# Patient Record
Sex: MALE | Race: WHITE
[De-identification: names, ages, dates, MRNs, and addresses within clinical notes are randomized per-mention and may not be internally consistent; named-entity substitution may affect disease eponyms.]

---

## 2019-08-20 ENCOUNTER — HOSPITAL ENCOUNTER (OUTPATIENT)
Dept: HOSPITAL 82 - ED | Age: 69
Setting detail: OBSERVATION
LOS: 2 days | Discharge: HOME | End: 2019-08-22
Attending: INTERNAL MEDICINE | Admitting: INTERNAL MEDICINE
Payer: MEDICARE

## 2019-08-20 VITALS — HEIGHT: 72 IN | WEIGHT: 315 LBS | BODY MASS INDEX: 42.66 KG/M2

## 2019-08-20 DIAGNOSIS — I51.7: ICD-10-CM

## 2019-08-20 DIAGNOSIS — Z79.84: ICD-10-CM

## 2019-08-20 DIAGNOSIS — I10: ICD-10-CM

## 2019-08-20 DIAGNOSIS — I25.10: ICD-10-CM

## 2019-08-20 DIAGNOSIS — Z79.82: ICD-10-CM

## 2019-08-20 DIAGNOSIS — J40: ICD-10-CM

## 2019-08-20 DIAGNOSIS — E11.65: ICD-10-CM

## 2019-08-20 DIAGNOSIS — D50.9: Primary | ICD-10-CM

## 2019-08-20 DIAGNOSIS — Z87.01: ICD-10-CM

## 2019-08-20 DIAGNOSIS — E11.40: ICD-10-CM

## 2019-08-20 DIAGNOSIS — E78.5: ICD-10-CM

## 2019-08-20 DIAGNOSIS — Z95.5: ICD-10-CM

## 2019-08-20 DIAGNOSIS — Z79.02: ICD-10-CM

## 2019-08-20 LAB
ALBUMIN SERPL-MCNC: 3.7 G/DL (ref 3.2–5)
ALP SERPL-CCNC: 144 U/L (ref 38–126)
ANION GAP SERPL CALCULATED.3IONS-SCNC: 14 MMOL/L
AST SERPL-CCNC: 18 U/L (ref 19–48)
BASOPHILS NFR BLD AUTO: 0 % (ref 0–3)
BILIRUB UR QL STRIP.AUTO: NEGATIVE
BUN SERPL-MCNC: 14 MG/DL (ref 8–23)
BUN/CREAT SERPL: 16
CHLORIDE SERPL-SCNC: 97 MMOL/L (ref 95–108)
CO2 SERPL-SCNC: 26 MMOL/L (ref 22–30)
COLOR UR AUTO: YELLOW
CREAT SERPL-MCNC: 0.9 MG/DL (ref 0.7–1.3)
EOSINOPHIL NFR BLD AUTO: 2 % (ref 0–8)
ERYTHROCYTE [DISTWIDTH] IN BLOOD BY AUTOMATED COUNT: 18.6 % (ref 11.5–15.5)
GLUCOSE UR STRIP.AUTO-MCNC: >=1000 MG/DL
HCT VFR BLD AUTO: 27.9 % (ref 39–50)
HGB BLD-MCNC: 8 G/DL (ref 14–18)
HGB UR QL STRIP.AUTO: (no result)
IMM GRANULOCYTES NFR BLD: 0.5 % (ref 0–5)
KETONES UR STRIP.AUTO-MCNC: NEGATIVE MG/DL
LEUKOCYTE ESTERASE UR QL STRIP.AUTO: NEGATIVE
LYMPHOCYTES NFR BLD: 6 % (ref 15–41)
MCH RBC QN AUTO: 20.3 PG  CALC (ref 26–32)
MCHC RBC AUTO-ENTMCNC: 28.7 G/L CALC (ref 32–36)
MCV RBC AUTO: 70.6 FL  CALC (ref 80–100)
MONOCYTES NFR BLD AUTO: 9 % (ref 2–13)
NEUTROPHILS # BLD AUTO: 11.74 THOU/UL (ref 1.82–7.42)
NEUTROPHILS NFR BLD AUTO: 82 % (ref 42–76)
NITRITE UR QL STRIP.AUTO: NEGATIVE
PH UR STRIP.AUTO: 6.5 [PH] (ref 4.5–8)
PLATELET # BLD AUTO: 264 THOU/UL (ref 130–400)
POTASSIUM SERPL-SCNC: 4.5 MMOL/L (ref 3.5–5.1)
PROT SERPL-MCNC: 7 G/DL (ref 6.3–8.2)
PROT UR QL STRIP.AUTO: NEGATIVE MG/DL
RBC # BLD AUTO: 3.95 MILL/UL (ref 4.7–6.1)
SODIUM SERPL-SCNC: 132 MMOL/L (ref 137–146)
SP GR UR STRIP.AUTO: 1.01
UROBILINOGEN UR QL STRIP.AUTO: 0.2 E.U./DL

## 2019-08-20 PROCEDURE — P9016 RBC LEUKOCYTES REDUCED: HCPCS

## 2019-08-21 VITALS — SYSTOLIC BLOOD PRESSURE: 138 MMHG | DIASTOLIC BLOOD PRESSURE: 83 MMHG

## 2019-08-21 VITALS — DIASTOLIC BLOOD PRESSURE: 71 MMHG | SYSTOLIC BLOOD PRESSURE: 167 MMHG

## 2019-08-21 VITALS — SYSTOLIC BLOOD PRESSURE: 131 MMHG | DIASTOLIC BLOOD PRESSURE: 69 MMHG

## 2019-08-21 VITALS — SYSTOLIC BLOOD PRESSURE: 134 MMHG | DIASTOLIC BLOOD PRESSURE: 72 MMHG

## 2019-08-21 VITALS — SYSTOLIC BLOOD PRESSURE: 115 MMHG | DIASTOLIC BLOOD PRESSURE: 67 MMHG

## 2019-08-21 VITALS — DIASTOLIC BLOOD PRESSURE: 78 MMHG | SYSTOLIC BLOOD PRESSURE: 151 MMHG

## 2019-08-21 VITALS — DIASTOLIC BLOOD PRESSURE: 74 MMHG | SYSTOLIC BLOOD PRESSURE: 134 MMHG

## 2019-08-21 VITALS — SYSTOLIC BLOOD PRESSURE: 124 MMHG | DIASTOLIC BLOOD PRESSURE: 47 MMHG

## 2019-08-21 VITALS — DIASTOLIC BLOOD PRESSURE: 47 MMHG | SYSTOLIC BLOOD PRESSURE: 124 MMHG

## 2019-08-21 VITALS — SYSTOLIC BLOOD PRESSURE: 132 MMHG | DIASTOLIC BLOOD PRESSURE: 58 MMHG

## 2019-08-21 VITALS — SYSTOLIC BLOOD PRESSURE: 148 MMHG | DIASTOLIC BLOOD PRESSURE: 78 MMHG

## 2019-08-21 LAB
ERYTHROCYTE [DISTWIDTH] IN BLOOD BY AUTOMATED COUNT: 19 % (ref 11.5–15.5)
HCT VFR BLD AUTO: 26.3 % (ref 39–50)
HGB BLD-MCNC: 7.4 G/DL (ref 14–18)
INR PPP: 1 RATIO (ref 0.7–1.3)
MCH RBC QN AUTO: 20.3 PG  CALC (ref 26–32)
MCHC RBC AUTO-ENTMCNC: 28.1 G/L CALC (ref 32–36)
MCV RBC AUTO: 72.1 FL  CALC (ref 80–100)
PLATELET # BLD AUTO: 249 THOU/UL (ref 130–400)
PROTHROMBIN TIME: 10.5 SECONDS (ref 9–12.5)
RBC # BLD AUTO: 3.65 MILL/UL (ref 4.7–6.1)

## 2019-08-21 PROCEDURE — 30233N1 TRANSFUSION OF NONAUTOLOGOUS RED BLOOD CELLS INTO PERIPHERAL VEIN, PERCUTANEOUS APPROACH: ICD-10-PCS | Performed by: INTERNAL MEDICINE

## 2019-08-21 NOTE — NUR
PT IS RESTING IN BED WITH NO S/S OF DISTRESS NOTED. PT DENIES NEEDS AT THIS
TIME. CALL LIGHT IN REACH.

## 2019-08-21 NOTE — NUR
REPORT FROM KEVIN SHIRLEY. PT SITTING UP IN BED. ALERT AND ORIENTED. NO DISTRESS
NOTED. PT DENIES ANY PAIN OR DISCOMFORT. PT C/O COUGH PRODUCING CLEAR THIN
MUCUS. IV SITE APPEARS HEALTHY. IV FLUIDS INFUSING WITHOUT DIFFICULTY.
DISCUSSED POC. PT VERBALIZED UNDERSTANDING. CALL LIGHT WITHIN REACH. WILL
CONTINUE TO MONITOR.

## 2019-08-21 NOTE — NUR
Admission Note
 
 
Report Given to:         ANGELA SHIRLEY
Transported by:           Wheelchair          X Stretcher
 
Transported with:        X Nurse     Transporter   X Patent IV    O2
                         X Cardiac Monitor

## 2019-08-21 NOTE — NUR
PT. ARRIVED TO THE FLOOR WITH ER NURSE, CARTER BENNETT IN OBTAINING VS. RT
NOTIFIED OF NEED FOR EKG.

## 2019-08-21 NOTE — NUR
ASSESSMENT DONE . TELE IN PLACE. PT IS A&O X3. PT DENIES PAIN AT THIS TIME.
IVF INFUSNING WELL. WIFE IN ROOM. PT DENIES NEEDS AT THIS TIME. CALL LIGHT IN
REACH.

## 2019-08-21 NOTE — NUR
ADMISSION ASSESSMENT COMPLETED. IV SITE PATENT AND BOLUS STARTED AS PER ORDER.
PT. IS ORIENTED TO CALL LIGHT, ROOM, AND POC; VERBALIZES UNDERSTANDING. WIFE
REMAINS AT BEDSIDE TO STAY THE NIGHT. PT. DENIES NEEDS/PAIN. TELEMETRY IN
PLACE. , PER PT. HE CAN NOT TAKE ANY INSULIN AS HE IS VERY SENSITIVE TO
IT. DECLINES TEDS AND SOCKS TO BE APPLIED. PT. INFORMED OF NEED FOR BM
SPECIMEN AND TO VOID AT ALL TIMES IN URINAL; VERBALIZES UNDERSTANDING. CALL
LIGHT IS IN REACH.

## 2019-08-21 NOTE — NUR
EXPLAIN TO PT S/S OF BLOOD TRANSFUSION. PT VERBALIZED UNDERSTANDING. BLOOD
TRANSFUSION STARTED. PT IS RESTING IN BED. WIFE IN ROOM. PT DENIES NEEDS AT
THIS TIME. CALL LIGHT IN REACH.

## 2019-08-21 NOTE — NUR
SPOKE WITH PEEWEE BARROW AND NOTIFIED HIM OF PT'S BS BEING 313 AND THAT PT. REPORTS HE
IS VERY SENSITIVE TO INSULIN AND WILL BOTTOM OUT. PER MD WE WILL LEAVE BS AS
IS FOR NOW. UPDATED PT.

## 2019-08-22 VITALS — DIASTOLIC BLOOD PRESSURE: 82 MMHG | SYSTOLIC BLOOD PRESSURE: 152 MMHG

## 2019-08-22 VITALS — DIASTOLIC BLOOD PRESSURE: 73 MMHG | SYSTOLIC BLOOD PRESSURE: 173 MMHG

## 2019-08-22 VITALS — SYSTOLIC BLOOD PRESSURE: 156 MMHG | DIASTOLIC BLOOD PRESSURE: 59 MMHG

## 2019-08-22 VITALS — DIASTOLIC BLOOD PRESSURE: 76 MMHG | SYSTOLIC BLOOD PRESSURE: 151 MMHG

## 2019-08-22 LAB
ANION GAP SERPL CALCULATED.3IONS-SCNC: 13 MMOL/L
BUN SERPL-MCNC: 13 MG/DL (ref 8–23)
BUN/CREAT SERPL: 15
CHLORIDE SERPL-SCNC: 104 MMOL/L (ref 95–108)
CO2 SERPL-SCNC: 26 MMOL/L (ref 22–30)
CREAT SERPL-MCNC: 0.8 MG/DL (ref 0.7–1.3)
ERYTHROCYTE [DISTWIDTH] IN BLOOD BY AUTOMATED COUNT: 19.3 % (ref 11.5–15.5)
HCT VFR BLD AUTO: 29.8 % (ref 39–50)
HGB BLD-MCNC: 8.5 G/DL (ref 14–18)
MCH RBC QN AUTO: 20.8 PG  CALC (ref 26–32)
MCHC RBC AUTO-ENTMCNC: 28.5 G/L CALC (ref 32–36)
MCV RBC AUTO: 73 FL  CALC (ref 80–100)
PLATELET # BLD AUTO: 254 THOU/UL (ref 130–400)
POTASSIUM SERPL-SCNC: 4.9 MMOL/L (ref 3.5–5.1)
RBC # BLD AUTO: 4.08 MILL/UL (ref 4.7–6.1)
SODIUM SERPL-SCNC: 138 MMOL/L (ref 137–146)

## 2019-08-22 NOTE — NUR
PT HAD INCONTINENT BOWEL EPISODE. COMPLETE LINEN CHANGE. PT ASSISTED WITH
PARTIAL BED BATH. PT DENIES ANY CURRENT WANTS OR NEEDS. WIFE REMAINS IN ROOM.
CALL LIGHT WITHIN REACH. WILL CONTINUE TO MONITOR.

## 2019-08-22 NOTE — NUR
REPORT WAS RECEIVED FROM TIEN. ASSESSMENT DONE. TELE IN PLACE. PT IS A&O X3.
PT DENIES PAIN. PT DENIES NEEDS AT THIS TIME. IVF INFUSING WELL. WIFE IN ROOM.
CALL LIGHT IN REACH.

## 2019-11-30 ENCOUNTER — HOSPITAL ENCOUNTER (OUTPATIENT)
Dept: HOSPITAL 82 - ED | Age: 69
Setting detail: OBSERVATION
LOS: 1 days | Discharge: HOME | End: 2019-12-01
Attending: INTERNAL MEDICINE | Admitting: INTERNAL MEDICINE
Payer: MEDICARE

## 2019-11-30 VITALS — BODY MASS INDEX: 42.66 KG/M2 | WEIGHT: 315 LBS | HEIGHT: 72 IN

## 2019-11-30 DIAGNOSIS — I10: ICD-10-CM

## 2019-11-30 DIAGNOSIS — E11.40: ICD-10-CM

## 2019-11-30 DIAGNOSIS — Z79.84: ICD-10-CM

## 2019-11-30 DIAGNOSIS — J10.1: Primary | ICD-10-CM

## 2019-11-30 DIAGNOSIS — I25.10: ICD-10-CM

## 2019-11-30 DIAGNOSIS — Z95.5: ICD-10-CM

## 2019-11-30 DIAGNOSIS — E11.65: ICD-10-CM

## 2019-11-30 DIAGNOSIS — R09.02: ICD-10-CM

## 2019-11-30 DIAGNOSIS — E78.5: ICD-10-CM

## 2019-11-30 DIAGNOSIS — I25.2: ICD-10-CM

## 2019-11-30 LAB
ALBUMIN SERPL-MCNC: 4.1 G/DL (ref 3.2–5)
ALP SERPL-CCNC: 166 U/L (ref 38–126)
AMYLASE SERPL-CCNC: 41 U/L (ref 30–110)
ANION GAP SERPL CALCULATED.3IONS-SCNC: 17 MMOL/L
AST SERPL-CCNC: 25 U/L (ref 19–48)
BACTERIA #/AREA URNS HPF: (no result) HPF
BASOPHILS NFR BLD AUTO: 0 % (ref 0–3)
BILIRUB UR QL STRIP.AUTO: NEGATIVE
BUN SERPL-MCNC: 14 MG/DL (ref 8–23)
BUN/CREAT SERPL: 18
CHLORIDE SERPL-SCNC: 97 MMOL/L (ref 95–108)
CO2 SERPL-SCNC: 26 MMOL/L (ref 22–30)
COLOR UR AUTO: YELLOW
CREAT SERPL-MCNC: 0.8 MG/DL (ref 0.7–1.3)
EOSINOPHIL NFR BLD AUTO: 3 % (ref 0–8)
EPI CELLS URNS QL MICRO: (no result) EPI/HPF
ERYTHROCYTE [DISTWIDTH] IN BLOOD BY AUTOMATED COUNT: 18.2 % (ref 11.5–15.5)
GLUCOSE UR STRIP.AUTO-MCNC: >=1000 MG/DL
HCT VFR BLD AUTO: 39.7 % (ref 39–50)
HGB BLD-MCNC: 12.3 G/DL (ref 14–18)
HGB UR QL STRIP.AUTO: (no result)
IMM GRANULOCYTES NFR BLD: 0.3 % (ref 0–5)
KETONES UR STRIP.AUTO-MCNC: NEGATIVE MG/DL
LEUKOCYTE ESTERASE UR QL STRIP.AUTO: NEGATIVE
LIPASE SERPL-CCNC: 201 U/L (ref 23–300)
LYMPHOCYTES NFR BLD: 6 % (ref 15–41)
MCH RBC QN AUTO: 25 PG  CALC (ref 26–32)
MCHC RBC AUTO-ENTMCNC: 31 G/L CALC (ref 32–36)
MCV RBC AUTO: 80.7 FL  CALC (ref 80–100)
MONOCYTES NFR BLD AUTO: 9 % (ref 2–13)
NEUTROPHILS # BLD AUTO: 8.5 THOU/UL (ref 1.82–7.42)
NEUTROPHILS NFR BLD AUTO: 81 % (ref 42–76)
NITRITE UR QL STRIP.AUTO: NEGATIVE
PH UR STRIP.AUTO: 6 [PH] (ref 4.5–8)
PLATELET # BLD AUTO: 198 THOU/UL (ref 130–400)
POTASSIUM SERPL-SCNC: 4.8 MMOL/L (ref 3.5–5.1)
PROT SERPL-MCNC: 7.7 G/DL (ref 6.3–8.2)
PROT UR QL STRIP.AUTO: (no result) MG/DL
RBC # BLD AUTO: 4.92 MILL/UL (ref 4.7–6.1)
SODIUM SERPL-SCNC: 134 MMOL/L (ref 137–146)
SP GR UR STRIP.AUTO: 1.01
UROBILINOGEN UR QL STRIP.AUTO: 0.2 E.U./DL
WBC #/AREA URNS HPF: (no result) WBC/HPF (ref 0–5)

## 2019-11-30 PROCEDURE — G9019 OSELTAMIVIR PHOSPHATE 75MG: HCPCS

## 2019-11-30 NOTE — NUR
PT. TO ROOM 10 VIA EMS WITH C/O FEVER AND WEAKNESS X 2 DAYS. PT. TEMP NOW
101.5. O2 SAT ON RA 89%. MD AWARE.

## 2019-12-01 VITALS — SYSTOLIC BLOOD PRESSURE: 153 MMHG | DIASTOLIC BLOOD PRESSURE: 56 MMHG

## 2019-12-01 VITALS — SYSTOLIC BLOOD PRESSURE: 158 MMHG | DIASTOLIC BLOOD PRESSURE: 66 MMHG

## 2019-12-01 VITALS — DIASTOLIC BLOOD PRESSURE: 55 MMHG | SYSTOLIC BLOOD PRESSURE: 147 MMHG

## 2019-12-01 LAB
ALBUMIN SERPL-MCNC: 3.6 G/DL (ref 3.2–5)
ALP SERPL-CCNC: 137 U/L (ref 38–126)
ANION GAP SERPL CALCULATED.3IONS-SCNC: 14 MMOL/L
AST SERPL-CCNC: 22 U/L (ref 19–48)
BASOPHILS NFR BLD AUTO: 0 % (ref 0–3)
BUN SERPL-MCNC: 14 MG/DL (ref 8–23)
BUN/CREAT SERPL: 17
CHLORIDE SERPL-SCNC: 100 MMOL/L (ref 95–108)
CO2 SERPL-SCNC: 26 MMOL/L (ref 22–30)
CREAT SERPL-MCNC: 0.8 MG/DL (ref 0.7–1.3)
EOSINOPHIL NFR BLD AUTO: 1 % (ref 0–8)
ERYTHROCYTE [DISTWIDTH] IN BLOOD BY AUTOMATED COUNT: 18.6 % (ref 11.5–15.5)
HCT VFR BLD AUTO: 37.4 % (ref 39–50)
HGB BLD-MCNC: 11.5 G/DL (ref 14–18)
IMM GRANULOCYTES NFR BLD: 0.4 % (ref 0–5)
LYMPHOCYTES NFR BLD: 12 % (ref 15–41)
MCH RBC QN AUTO: 25.1 PG  CALC (ref 26–32)
MCHC RBC AUTO-ENTMCNC: 30.7 G/L CALC (ref 32–36)
MCV RBC AUTO: 81.7 FL  CALC (ref 80–100)
MONOCYTES NFR BLD AUTO: 13 % (ref 2–13)
NEUTROPHILS # BLD AUTO: 7.18 THOU/UL (ref 1.82–7.42)
NEUTROPHILS NFR BLD AUTO: 74 % (ref 42–76)
PLATELET # BLD AUTO: 176 THOU/UL (ref 130–400)
POTASSIUM SERPL-SCNC: 4.3 MMOL/L (ref 3.5–5.1)
PROT SERPL-MCNC: 7 G/DL (ref 6.3–8.2)
RBC # BLD AUTO: 4.58 MILL/UL (ref 4.7–6.1)
SODIUM SERPL-SCNC: 135 MMOL/L (ref 137–146)

## 2019-12-01 NOTE — NUR
MD ORDERED INSULIN, PT. REFUSED STATING," IT ONLY MAKES MY BG GO HIGHER, MD
AWARE. PT ACCU CHECK [id number].

## 2019-12-01 NOTE — NUR
Admission Note
 
 
Report Given to:         CECI SHIRLEY
Transported by:           Wheelchair          X Stretcher
 
Transported with:        X Nurse     Transporter   X Patent IV   X O2
                         X Cardiac Monitor

## 2019-12-01 NOTE — NUR
PT TAKEN OUT OF UNIT BY WC WITH WIFE & ALL BELONGINGS IN STABLE CONDITION. PT
& WIFE THANKFUL OF CARE.

## 2019-12-01 NOTE — NUR
WIFE @BEDSIDE EDUCATED ON PPT & SPREAD OF VIRUS. PT DENIES N/V/D, DENIES PAIN,
DENIES SOB, DENIES COUGH.

## 2019-12-01 NOTE — NUR
PT ARRIVED TO UNIT VIA STRETCHER WITH ER STAFF; ALERT AND ORIENTED. AMBULATED
TO BED WITH WEAK UNSTEADY GAIT. DENIES PAIN; DOES HAVE CHRONIC LOWER BACK
PAIN. RESPIRATIONS EVEN AND UNLABORED ON OXYGEN 2L VIA NC. ASSESSMENT
COMPLETED ON ADMIT; LUNGS ARE CLEAR; HR REGULAR; 2+ PITTING EDEMA TO BLE.
FLUIDS INITIATED AT THIS TIME; EMS IV TO RW APPEARS HEALTHY AND FLUSHES. WIFE
NOW AT BEDSIDE. ORIENTED TO ROOM AND CALL LIGHT SYSTEM. PLAN OF CARE
DISCUSSED. PT ENCOURAGED TO VERBALIZE CONCERNS.  STATES UNDERSTANDING. SAFETY
MEASURES IN PLACE AND PT PLACED ON DROPLET PRECAUTIONS. CALL LIGHT WITHIN
REACH.

## 2019-12-02 ENCOUNTER — HOSPITAL ENCOUNTER (INPATIENT)
Dept: HOSPITAL 82 - ED | Age: 69
LOS: 3 days | Discharge: HOME | DRG: 193 | End: 2019-12-05
Attending: INTERNAL MEDICINE | Admitting: INTERNAL MEDICINE
Payer: MEDICARE

## 2019-12-02 VITALS — DIASTOLIC BLOOD PRESSURE: 72 MMHG | SYSTOLIC BLOOD PRESSURE: 162 MMHG

## 2019-12-02 VITALS — DIASTOLIC BLOOD PRESSURE: 75 MMHG | SYSTOLIC BLOOD PRESSURE: 177 MMHG

## 2019-12-02 VITALS — HEIGHT: 72 IN | BODY MASS INDEX: 42.66 KG/M2 | WEIGHT: 315 LBS

## 2019-12-02 VITALS — SYSTOLIC BLOOD PRESSURE: 130 MMHG | DIASTOLIC BLOOD PRESSURE: 75 MMHG

## 2019-12-02 DIAGNOSIS — Z79.84: ICD-10-CM

## 2019-12-02 DIAGNOSIS — I25.2: ICD-10-CM

## 2019-12-02 DIAGNOSIS — R09.02: ICD-10-CM

## 2019-12-02 DIAGNOSIS — E11.40: ICD-10-CM

## 2019-12-02 DIAGNOSIS — I10: ICD-10-CM

## 2019-12-02 DIAGNOSIS — Z95.5: ICD-10-CM

## 2019-12-02 DIAGNOSIS — E78.5: ICD-10-CM

## 2019-12-02 DIAGNOSIS — J96.01: ICD-10-CM

## 2019-12-02 DIAGNOSIS — G93.49: ICD-10-CM

## 2019-12-02 DIAGNOSIS — J10.1: ICD-10-CM

## 2019-12-02 DIAGNOSIS — E11.65: ICD-10-CM

## 2019-12-02 DIAGNOSIS — J11.00: Primary | ICD-10-CM

## 2019-12-02 DIAGNOSIS — I25.10: ICD-10-CM

## 2019-12-02 LAB
ALBUMIN SERPL-MCNC: 4.1 G/DL (ref 3.2–5)
ALP SERPL-CCNC: 156 U/L (ref 38–126)
ANION GAP SERPL CALCULATED.3IONS-SCNC: 18 MMOL/L
AST SERPL-CCNC: 28 U/L (ref 19–48)
BACTERIA #/AREA URNS HPF: (no result) HPF
BASOPHILS NFR BLD AUTO: 0 % (ref 0–3)
BILIRUB UR QL STRIP.AUTO: NEGATIVE
BUN SERPL-MCNC: 15 MG/DL (ref 8–23)
BUN/CREAT SERPL: 16
CHLORIDE SERPL-SCNC: 98 MMOL/L (ref 95–108)
CO2 SERPL-SCNC: 24 MMOL/L (ref 22–30)
COLOR UR AUTO: YELLOW
CREAT SERPL-MCNC: 1 MG/DL (ref 0.7–1.3)
EOSINOPHIL NFR BLD AUTO: 4 % (ref 0–8)
EPI CELLS URNS QL MICRO: (no result) EPI/HPF
ERYTHROCYTE [DISTWIDTH] IN BLOOD BY AUTOMATED COUNT: 18.3 % (ref 11.5–15.5)
GLUCOSE UR STRIP.AUTO-MCNC: >=1000 MG/DL
HCT VFR BLD AUTO: 40.4 % (ref 39–50)
HGB BLD-MCNC: 12.2 G/DL (ref 14–18)
HGB UR QL STRIP.AUTO: (no result)
IMM GRANULOCYTES NFR BLD: 0.4 % (ref 0–5)
KETONES UR STRIP.AUTO-MCNC: NEGATIVE MG/DL
LEUKOCYTE ESTERASE UR QL STRIP.AUTO: NEGATIVE
LYMPHOCYTES NFR BLD: 13 % (ref 15–41)
MCH RBC QN AUTO: 25 PG  CALC (ref 26–32)
MCHC RBC AUTO-ENTMCNC: 30.2 G/L CALC (ref 32–36)
MCV RBC AUTO: 82.8 FL  CALC (ref 80–100)
MONOCYTES NFR BLD AUTO: 10 % (ref 2–13)
NEUTROPHILS # BLD AUTO: 7.16 THOU/UL (ref 1.82–7.42)
NEUTROPHILS NFR BLD AUTO: 73 % (ref 42–76)
NITRITE UR QL STRIP.AUTO: NEGATIVE
PH UR STRIP.AUTO: 5.5 [PH] (ref 4.5–8)
PLATELET # BLD AUTO: 184 THOU/UL (ref 130–400)
POTASSIUM SERPL-SCNC: 4.5 MMOL/L (ref 3.5–5.1)
PROT SERPL-MCNC: 7.9 G/DL (ref 6.3–8.2)
PROT UR QL STRIP.AUTO: (no result) MG/DL
RBC # BLD AUTO: 4.88 MILL/UL (ref 4.7–6.1)
RBC #/AREA URNS HPF: (no result) RBC/HPF (ref 0–5)
SODIUM SERPL-SCNC: 136 MMOL/L (ref 137–146)
SP GR UR STRIP.AUTO: 1.02
UROBILINOGEN UR QL STRIP.AUTO: 0.2 E.U./DL
WBC #/AREA URNS HPF: (no result) WBC/HPF (ref 0–5)

## 2019-12-02 PROCEDURE — G9019 OSELTAMIVIR PHOSPHATE 75MG: HCPCS

## 2019-12-03 VITALS — DIASTOLIC BLOOD PRESSURE: 84 MMHG | SYSTOLIC BLOOD PRESSURE: 165 MMHG

## 2019-12-03 VITALS — DIASTOLIC BLOOD PRESSURE: 58 MMHG | SYSTOLIC BLOOD PRESSURE: 119 MMHG

## 2019-12-03 VITALS — SYSTOLIC BLOOD PRESSURE: 180 MMHG | DIASTOLIC BLOOD PRESSURE: 90 MMHG

## 2019-12-03 VITALS — DIASTOLIC BLOOD PRESSURE: 77 MMHG | SYSTOLIC BLOOD PRESSURE: 160 MMHG

## 2019-12-03 VITALS — DIASTOLIC BLOOD PRESSURE: 90 MMHG | SYSTOLIC BLOOD PRESSURE: 180 MMHG

## 2019-12-03 VITALS — DIASTOLIC BLOOD PRESSURE: 75 MMHG | SYSTOLIC BLOOD PRESSURE: 151 MMHG

## 2019-12-03 VITALS — SYSTOLIC BLOOD PRESSURE: 159 MMHG | DIASTOLIC BLOOD PRESSURE: 76 MMHG

## 2019-12-03 VITALS — DIASTOLIC BLOOD PRESSURE: 77 MMHG | SYSTOLIC BLOOD PRESSURE: 150 MMHG

## 2019-12-03 VITALS — SYSTOLIC BLOOD PRESSURE: 173 MMHG | DIASTOLIC BLOOD PRESSURE: 78 MMHG

## 2019-12-03 VITALS — DIASTOLIC BLOOD PRESSURE: 75 MMHG | SYSTOLIC BLOOD PRESSURE: 146 MMHG

## 2019-12-03 VITALS — DIASTOLIC BLOOD PRESSURE: 74 MMHG | SYSTOLIC BLOOD PRESSURE: 154 MMHG

## 2019-12-03 VITALS — SYSTOLIC BLOOD PRESSURE: 159 MMHG | DIASTOLIC BLOOD PRESSURE: 78 MMHG

## 2019-12-03 VITALS — DIASTOLIC BLOOD PRESSURE: 77 MMHG | SYSTOLIC BLOOD PRESSURE: 151 MMHG

## 2019-12-03 VITALS — DIASTOLIC BLOOD PRESSURE: 78 MMHG | SYSTOLIC BLOOD PRESSURE: 157 MMHG

## 2019-12-03 VITALS — SYSTOLIC BLOOD PRESSURE: 137 MMHG | DIASTOLIC BLOOD PRESSURE: 74 MMHG

## 2019-12-03 VITALS — SYSTOLIC BLOOD PRESSURE: 161 MMHG | DIASTOLIC BLOOD PRESSURE: 77 MMHG

## 2019-12-03 VITALS — DIASTOLIC BLOOD PRESSURE: 93 MMHG | SYSTOLIC BLOOD PRESSURE: 179 MMHG

## 2019-12-03 VITALS — SYSTOLIC BLOOD PRESSURE: 147 MMHG | DIASTOLIC BLOOD PRESSURE: 79 MMHG

## 2019-12-03 LAB
ANION GAP SERPL CALCULATED.3IONS-SCNC: 14 MMOL/L
BASOPHILS NFR BLD AUTO: 0 % (ref 0–3)
BUN SERPL-MCNC: 14 MG/DL (ref 8–23)
BUN/CREAT SERPL: 17
CHLORIDE SERPL-SCNC: 102 MMOL/L (ref 95–108)
CO2 SERPL-SCNC: 25 MMOL/L (ref 22–30)
CREAT SERPL-MCNC: 0.9 MG/DL (ref 0.7–1.3)
EOSINOPHIL NFR BLD AUTO: 1 % (ref 0–8)
ERYTHROCYTE [DISTWIDTH] IN BLOOD BY AUTOMATED COUNT: 18.6 % (ref 11.5–15.5)
HCT VFR BLD AUTO: 38.1 % (ref 39–50)
HGB BLD-MCNC: 11.5 G/DL (ref 14–18)
IMM GRANULOCYTES NFR BLD: 0.5 % (ref 0–5)
LYMPHOCYTES NFR BLD: 19 % (ref 15–41)
MCH RBC QN AUTO: 25.1 PG  CALC (ref 26–32)
MCHC RBC AUTO-ENTMCNC: 30.2 G/L CALC (ref 32–36)
MCV RBC AUTO: 83.2 FL  CALC (ref 80–100)
MONOCYTES NFR BLD AUTO: 17 % (ref 2–13)
NEUTROPHILS # BLD AUTO: 6.91 THOU/UL (ref 1.82–7.42)
NEUTROPHILS NFR BLD AUTO: 63 % (ref 42–76)
PLATELET # BLD AUTO: 172 THOU/UL (ref 130–400)
POTASSIUM SERPL-SCNC: 4.3 MMOL/L (ref 3.5–5.1)
RBC # BLD AUTO: 4.58 MILL/UL (ref 4.7–6.1)
SODIUM SERPL-SCNC: 136 MMOL/L (ref 137–146)

## 2019-12-04 VITALS — DIASTOLIC BLOOD PRESSURE: 93 MMHG | SYSTOLIC BLOOD PRESSURE: 168 MMHG

## 2019-12-04 VITALS — DIASTOLIC BLOOD PRESSURE: 63 MMHG | SYSTOLIC BLOOD PRESSURE: 110 MMHG

## 2019-12-04 VITALS — SYSTOLIC BLOOD PRESSURE: 180 MMHG | DIASTOLIC BLOOD PRESSURE: 85 MMHG

## 2019-12-04 VITALS — DIASTOLIC BLOOD PRESSURE: 90 MMHG | SYSTOLIC BLOOD PRESSURE: 168 MMHG

## 2019-12-04 VITALS — SYSTOLIC BLOOD PRESSURE: 175 MMHG | DIASTOLIC BLOOD PRESSURE: 82 MMHG

## 2019-12-04 VITALS — SYSTOLIC BLOOD PRESSURE: 177 MMHG | DIASTOLIC BLOOD PRESSURE: 83 MMHG

## 2019-12-04 VITALS — SYSTOLIC BLOOD PRESSURE: 176 MMHG | DIASTOLIC BLOOD PRESSURE: 84 MMHG

## 2019-12-04 VITALS — DIASTOLIC BLOOD PRESSURE: 80 MMHG | SYSTOLIC BLOOD PRESSURE: 148 MMHG

## 2019-12-04 VITALS — DIASTOLIC BLOOD PRESSURE: 83 MMHG | SYSTOLIC BLOOD PRESSURE: 166 MMHG

## 2019-12-04 VITALS — SYSTOLIC BLOOD PRESSURE: 168 MMHG | DIASTOLIC BLOOD PRESSURE: 85 MMHG

## 2019-12-04 LAB
ANION GAP SERPL CALCULATED.3IONS-SCNC: 12 MMOL/L
BUN SERPL-MCNC: 13 MG/DL (ref 8–23)
BUN/CREAT SERPL: 15
CHLORIDE SERPL-SCNC: 101 MMOL/L (ref 95–108)
CO2 SERPL-SCNC: 26 MMOL/L (ref 22–30)
CREAT SERPL-MCNC: 0.8 MG/DL (ref 0.7–1.3)
ERYTHROCYTE [DISTWIDTH] IN BLOOD BY AUTOMATED COUNT: 18.5 % (ref 11.5–15.5)
HCT VFR BLD AUTO: 35.5 % (ref 39–50)
HGB BLD-MCNC: 10.9 G/DL (ref 14–18)
MCH RBC QN AUTO: 25.1 PG  CALC (ref 26–32)
MCHC RBC AUTO-ENTMCNC: 30.7 G/L CALC (ref 32–36)
MCV RBC AUTO: 81.6 FL  CALC (ref 80–100)
PLATELET # BLD AUTO: 174 THOU/UL (ref 130–400)
POTASSIUM SERPL-SCNC: 4.3 MMOL/L (ref 3.5–5.1)
RBC # BLD AUTO: 4.35 MILL/UL (ref 4.7–6.1)
SODIUM SERPL-SCNC: 135 MMOL/L (ref 137–146)

## 2019-12-05 VITALS — DIASTOLIC BLOOD PRESSURE: 92 MMHG | SYSTOLIC BLOOD PRESSURE: 187 MMHG

## 2019-12-05 VITALS — SYSTOLIC BLOOD PRESSURE: 155 MMHG | DIASTOLIC BLOOD PRESSURE: 64 MMHG

## 2019-12-05 VITALS — SYSTOLIC BLOOD PRESSURE: 181 MMHG | DIASTOLIC BLOOD PRESSURE: 82 MMHG

## 2019-12-05 VITALS — SYSTOLIC BLOOD PRESSURE: 176 MMHG | DIASTOLIC BLOOD PRESSURE: 92 MMHG

## 2019-12-05 VITALS — SYSTOLIC BLOOD PRESSURE: 171 MMHG | DIASTOLIC BLOOD PRESSURE: 89 MMHG

## 2019-12-05 VITALS — SYSTOLIC BLOOD PRESSURE: 172 MMHG | DIASTOLIC BLOOD PRESSURE: 74 MMHG

## 2019-12-05 VITALS — SYSTOLIC BLOOD PRESSURE: 170 MMHG | DIASTOLIC BLOOD PRESSURE: 73 MMHG
